# Patient Record
Sex: FEMALE
[De-identification: names, ages, dates, MRNs, and addresses within clinical notes are randomized per-mention and may not be internally consistent; named-entity substitution may affect disease eponyms.]

---

## 2021-10-04 ENCOUNTER — RESULT REVIEW (OUTPATIENT)
Age: 26
End: 2021-10-04

## 2021-11-29 PROBLEM — Z00.00 ENCOUNTER FOR PREVENTIVE HEALTH EXAMINATION: Status: ACTIVE | Noted: 2021-11-29

## 2021-11-30 ENCOUNTER — ASOB RESULT (OUTPATIENT)
Age: 26
End: 2021-11-30

## 2021-11-30 ENCOUNTER — APPOINTMENT (OUTPATIENT)
Dept: ANTEPARTUM | Facility: CLINIC | Age: 26
End: 2021-11-30
Payer: COMMERCIAL

## 2021-11-30 PROCEDURE — 76805 OB US >/= 14 WKS SNGL FETUS: CPT

## 2021-11-30 PROCEDURE — 76817 TRANSVAGINAL US OBSTETRIC: CPT

## 2022-02-08 ENCOUNTER — APPOINTMENT (OUTPATIENT)
Dept: ANTEPARTUM | Facility: CLINIC | Age: 27
End: 2022-02-08
Payer: COMMERCIAL

## 2022-02-08 ENCOUNTER — ASOB RESULT (OUTPATIENT)
Age: 27
End: 2022-02-08

## 2022-02-08 PROCEDURE — 76816 OB US FOLLOW-UP PER FETUS: CPT

## 2022-03-22 ENCOUNTER — ASOB RESULT (OUTPATIENT)
Age: 27
End: 2022-03-22

## 2022-03-22 ENCOUNTER — APPOINTMENT (OUTPATIENT)
Dept: ANTEPARTUM | Facility: CLINIC | Age: 27
End: 2022-03-22
Payer: COMMERCIAL

## 2022-03-22 PROCEDURE — 76819 FETAL BIOPHYS PROFIL W/O NST: CPT

## 2022-04-10 ENCOUNTER — OUTPATIENT (OUTPATIENT)
Dept: OUTPATIENT SERVICES | Facility: HOSPITAL | Age: 27
LOS: 1 days | End: 2022-04-10
Payer: COMMERCIAL

## 2022-04-10 LAB — AMNISURE ROM (RUPTURE OF MEMBRANES): NEGATIVE — SIGNIFICANT CHANGE UP

## 2022-04-10 PROCEDURE — 84112 EVAL AMNIOTIC FLUID PROTEIN: CPT

## 2022-04-10 PROCEDURE — 99214 OFFICE O/P EST MOD 30 MIN: CPT

## 2022-04-12 ENCOUNTER — RESULT REVIEW (OUTPATIENT)
Age: 27
End: 2022-04-12

## 2022-04-12 ENCOUNTER — INPATIENT (INPATIENT)
Facility: HOSPITAL | Age: 27
LOS: 1 days | Discharge: HOME | End: 2022-04-14
Attending: OBSTETRICS & GYNECOLOGY | Admitting: OBSTETRICS & GYNECOLOGY
Payer: COMMERCIAL

## 2022-04-12 VITALS — WEIGHT: 125.66 LBS | HEIGHT: 66 IN

## 2022-04-12 DIAGNOSIS — Z3A.00 WEEKS OF GESTATION OF PREGNANCY NOT SPECIFIED: ICD-10-CM

## 2022-04-12 DIAGNOSIS — O26.899 OTHER SPECIFIED PREGNANCY RELATED CONDITIONS, UNSPECIFIED TRIMESTER: ICD-10-CM

## 2022-04-12 LAB
BASOPHILS # BLD AUTO: 0.04 K/UL — SIGNIFICANT CHANGE UP (ref 0–0.2)
BASOPHILS NFR BLD AUTO: 0.4 % — SIGNIFICANT CHANGE UP (ref 0–2)
BLD GP AB SCN SERPL QL: NEGATIVE — SIGNIFICANT CHANGE UP
COVID-19 SPIKE DOMAIN AB INTERP: POSITIVE
COVID-19 SPIKE DOMAIN ANTIBODY RESULT: >250 U/ML — HIGH
EOSINOPHIL # BLD AUTO: 0.04 K/UL — SIGNIFICANT CHANGE UP (ref 0–0.5)
EOSINOPHIL NFR BLD AUTO: 0.4 % — SIGNIFICANT CHANGE UP (ref 0–6)
HCT VFR BLD CALC: 33.9 % — LOW (ref 34.5–45)
HGB BLD-MCNC: 11.5 G/DL — SIGNIFICANT CHANGE UP (ref 11.5–15.5)
IMM GRANULOCYTES NFR BLD AUTO: 1.6 % — HIGH (ref 0–1.5)
LYMPHOCYTES # BLD AUTO: 18.9 % — SIGNIFICANT CHANGE UP (ref 13–44)
LYMPHOCYTES # BLD AUTO: 2.07 K/UL — SIGNIFICANT CHANGE UP (ref 1–3.3)
MCHC RBC-ENTMCNC: 27.6 PG — SIGNIFICANT CHANGE UP (ref 27–34)
MCHC RBC-ENTMCNC: 33.9 GM/DL — SIGNIFICANT CHANGE UP (ref 32–36)
MCV RBC AUTO: 81.3 FL — SIGNIFICANT CHANGE UP (ref 80–100)
MONOCYTES # BLD AUTO: 0.71 K/UL — SIGNIFICANT CHANGE UP (ref 0–0.9)
MONOCYTES NFR BLD AUTO: 6.5 % — SIGNIFICANT CHANGE UP (ref 2–14)
NEUTROPHILS # BLD AUTO: 7.9 K/UL — HIGH (ref 1.8–7.4)
NEUTROPHILS NFR BLD AUTO: 72.2 % — SIGNIFICANT CHANGE UP (ref 43–77)
NRBC # BLD: 0 /100 WBCS — SIGNIFICANT CHANGE UP (ref 0–0)
PLATELET # BLD AUTO: 324 K/UL — SIGNIFICANT CHANGE UP (ref 150–400)
RBC # BLD: 4.17 M/UL — SIGNIFICANT CHANGE UP (ref 3.8–5.2)
RBC # FLD: 17.8 % — HIGH (ref 10.3–14.5)
RH IG SCN BLD-IMP: POSITIVE — SIGNIFICANT CHANGE UP
RH IG SCN BLD-IMP: POSITIVE — SIGNIFICANT CHANGE UP
SARS-COV-2 IGG+IGM SERPL QL IA: >250 U/ML — HIGH
SARS-COV-2 IGG+IGM SERPL QL IA: POSITIVE
SARS-COV-2 RNA SPEC QL NAA+PROBE: NEGATIVE — SIGNIFICANT CHANGE UP
T PALLIDUM AB TITR SER: NEGATIVE — SIGNIFICANT CHANGE UP
WBC # BLD: 10.94 K/UL — HIGH (ref 3.8–10.5)
WBC # FLD AUTO: 10.94 K/UL — HIGH (ref 3.8–10.5)

## 2022-04-12 PROCEDURE — 88307 TISSUE EXAM BY PATHOLOGIST: CPT | Mod: 26

## 2022-04-12 RX ORDER — AER TRAVELER 0.5 G/1
1 SOLUTION RECTAL; TOPICAL EVERY 4 HOURS
Refills: 0 | Status: DISCONTINUED | OUTPATIENT
Start: 2022-04-12 | End: 2022-04-14

## 2022-04-12 RX ORDER — SODIUM CHLORIDE 9 MG/ML
1000 INJECTION, SOLUTION INTRAVENOUS
Refills: 0 | Status: DISCONTINUED | OUTPATIENT
Start: 2022-04-12 | End: 2022-04-12

## 2022-04-12 RX ORDER — OXYCODONE HYDROCHLORIDE 5 MG/1
5 TABLET ORAL ONCE
Refills: 0 | Status: DISCONTINUED | OUTPATIENT
Start: 2022-04-12 | End: 2022-04-14

## 2022-04-12 RX ORDER — ACETAMINOPHEN 500 MG
975 TABLET ORAL
Refills: 0 | Status: DISCONTINUED | OUTPATIENT
Start: 2022-04-12 | End: 2022-04-14

## 2022-04-12 RX ORDER — PRAMOXINE HYDROCHLORIDE 150 MG/15G
1 AEROSOL, FOAM RECTAL EVERY 4 HOURS
Refills: 0 | Status: DISCONTINUED | OUTPATIENT
Start: 2022-04-12 | End: 2022-04-14

## 2022-04-12 RX ORDER — OXYTOCIN 10 UNIT/ML
333.33 VIAL (ML) INJECTION
Qty: 20 | Refills: 0 | Status: DISCONTINUED | OUTPATIENT
Start: 2022-04-12 | End: 2022-04-12

## 2022-04-12 RX ORDER — OXYTOCIN 10 UNIT/ML
2 VIAL (ML) INJECTION
Qty: 30 | Refills: 0 | Status: DISCONTINUED | OUTPATIENT
Start: 2022-04-12 | End: 2022-04-12

## 2022-04-12 RX ORDER — MAGNESIUM HYDROXIDE 400 MG/1
30 TABLET, CHEWABLE ORAL
Refills: 0 | Status: DISCONTINUED | OUTPATIENT
Start: 2022-04-12 | End: 2022-04-14

## 2022-04-12 RX ORDER — CITRIC ACID/SODIUM CITRATE 300-500 MG
15 SOLUTION, ORAL ORAL EVERY 6 HOURS
Refills: 0 | Status: DISCONTINUED | OUTPATIENT
Start: 2022-04-12 | End: 2022-04-12

## 2022-04-12 RX ORDER — HYDROCORTISONE 1 %
1 OINTMENT (GRAM) TOPICAL EVERY 6 HOURS
Refills: 0 | Status: DISCONTINUED | OUTPATIENT
Start: 2022-04-12 | End: 2022-04-14

## 2022-04-12 RX ORDER — LANOLIN
1 OINTMENT (GRAM) TOPICAL EVERY 6 HOURS
Refills: 0 | Status: DISCONTINUED | OUTPATIENT
Start: 2022-04-12 | End: 2022-04-14

## 2022-04-12 RX ORDER — SODIUM CHLORIDE 9 MG/ML
3 INJECTION INTRAMUSCULAR; INTRAVENOUS; SUBCUTANEOUS EVERY 8 HOURS
Refills: 0 | Status: DISCONTINUED | OUTPATIENT
Start: 2022-04-12 | End: 2022-04-14

## 2022-04-12 RX ORDER — BENZOCAINE 10 %
1 GEL (GRAM) MUCOUS MEMBRANE EVERY 6 HOURS
Refills: 0 | Status: DISCONTINUED | OUTPATIENT
Start: 2022-04-12 | End: 2022-04-14

## 2022-04-12 RX ORDER — TETANUS TOXOID, REDUCED DIPHTHERIA TOXOID AND ACELLULAR PERTUSSIS VACCINE, ADSORBED 5; 2.5; 8; 8; 2.5 [IU]/.5ML; [IU]/.5ML; UG/.5ML; UG/.5ML; UG/.5ML
0.5 SUSPENSION INTRAMUSCULAR ONCE
Refills: 0 | Status: DISCONTINUED | OUTPATIENT
Start: 2022-04-12 | End: 2022-04-14

## 2022-04-12 RX ORDER — KETOROLAC TROMETHAMINE 30 MG/ML
30 SYRINGE (ML) INJECTION ONCE
Refills: 0 | Status: DISCONTINUED | OUTPATIENT
Start: 2022-04-12 | End: 2022-04-12

## 2022-04-12 RX ORDER — IBUPROFEN 200 MG
600 TABLET ORAL EVERY 6 HOURS
Refills: 0 | Status: DISCONTINUED | OUTPATIENT
Start: 2022-04-12 | End: 2022-04-14

## 2022-04-12 RX ORDER — FENTANYL/BUPIVACAINE/NS/PF 2MCG/ML-.1
250 PLASTIC BAG, INJECTION (ML) INJECTION
Refills: 0 | Status: DISCONTINUED | OUTPATIENT
Start: 2022-04-12 | End: 2022-04-12

## 2022-04-12 RX ORDER — OXYCODONE HYDROCHLORIDE 5 MG/1
5 TABLET ORAL
Refills: 0 | Status: DISCONTINUED | OUTPATIENT
Start: 2022-04-12 | End: 2022-04-14

## 2022-04-12 RX ORDER — IBUPROFEN 200 MG
600 TABLET ORAL EVERY 6 HOURS
Refills: 0 | Status: COMPLETED | OUTPATIENT
Start: 2022-04-12 | End: 2023-03-11

## 2022-04-12 RX ORDER — DIPHENHYDRAMINE HCL 50 MG
25 CAPSULE ORAL EVERY 6 HOURS
Refills: 0 | Status: DISCONTINUED | OUTPATIENT
Start: 2022-04-12 | End: 2022-04-14

## 2022-04-12 RX ORDER — SIMETHICONE 80 MG/1
80 TABLET, CHEWABLE ORAL EVERY 4 HOURS
Refills: 0 | Status: DISCONTINUED | OUTPATIENT
Start: 2022-04-12 | End: 2022-04-14

## 2022-04-12 RX ORDER — DIBUCAINE 1 %
1 OINTMENT (GRAM) RECTAL EVERY 6 HOURS
Refills: 0 | Status: DISCONTINUED | OUTPATIENT
Start: 2022-04-12 | End: 2022-04-14

## 2022-04-12 RX ORDER — OXYTOCIN 10 UNIT/ML
333.33 VIAL (ML) INJECTION
Qty: 20 | Refills: 0 | Status: DISCONTINUED | OUTPATIENT
Start: 2022-04-12 | End: 2022-04-14

## 2022-04-12 RX ADMIN — Medication 975 MILLIGRAM(S): at 21:01

## 2022-04-12 RX ADMIN — Medication 600 MILLIGRAM(S): at 20:00

## 2022-04-12 RX ADMIN — AER TRAVELER 1 APPLICATION(S): 0.5 SOLUTION RECTAL; TOPICAL at 17:37

## 2022-04-12 RX ADMIN — Medication 30 MILLIGRAM(S): at 13:18

## 2022-04-12 RX ADMIN — Medication 1 APPLICATION(S): at 17:37

## 2022-04-12 RX ADMIN — Medication 1 APPLICATION(S): at 17:38

## 2022-04-12 RX ADMIN — Medication 600 MILLIGRAM(S): at 19:03

## 2022-04-12 RX ADMIN — SODIUM CHLORIDE 125 MILLILITER(S): 9 INJECTION, SOLUTION INTRAVENOUS at 04:31

## 2022-04-12 RX ADMIN — SODIUM CHLORIDE 3 MILLILITER(S): 9 INJECTION INTRAMUSCULAR; INTRAVENOUS; SUBCUTANEOUS at 21:21

## 2022-04-12 RX ADMIN — Medication 975 MILLIGRAM(S): at 21:42

## 2022-04-12 RX ADMIN — Medication 2 MILLIUNIT(S)/MIN: at 06:36

## 2022-04-12 RX ADMIN — SODIUM CHLORIDE 125 MILLILITER(S): 9 INJECTION, SOLUTION INTRAVENOUS at 04:34

## 2022-04-12 RX ADMIN — Medication 1 SPRAY(S): at 17:37

## 2022-04-12 NOTE — PATIENT PROFILE OB - FALL HARM RISK - UNIVERSAL INTERVENTIONS
Bed in lowest position, wheels locked, appropriate side rails in place/Call bell, personal items and telephone in reach/Instruct patient to call for assistance before getting out of bed or chair/Non-slip footwear when patient is out of bed/[place] to call system/Physically safe environment - no spills, clutter or unnecessary equipment/Purposeful Proactive Rounding/Room/bathroom lighting operational, light cord in reach

## 2022-04-13 RX ADMIN — Medication 975 MILLIGRAM(S): at 20:20

## 2022-04-13 RX ADMIN — Medication 975 MILLIGRAM(S): at 09:15

## 2022-04-13 RX ADMIN — Medication 975 MILLIGRAM(S): at 15:15

## 2022-04-13 RX ADMIN — SODIUM CHLORIDE 3 MILLILITER(S): 9 INJECTION INTRAMUSCULAR; INTRAVENOUS; SUBCUTANEOUS at 05:25

## 2022-04-13 RX ADMIN — Medication 600 MILLIGRAM(S): at 00:42

## 2022-04-13 RX ADMIN — Medication 1 TABLET(S): at 11:31

## 2022-04-13 RX ADMIN — Medication 600 MILLIGRAM(S): at 01:26

## 2022-04-13 RX ADMIN — Medication 600 MILLIGRAM(S): at 12:00

## 2022-04-13 RX ADMIN — Medication 600 MILLIGRAM(S): at 18:30

## 2022-04-13 RX ADMIN — SODIUM CHLORIDE 3 MILLILITER(S): 9 INJECTION INTRAMUSCULAR; INTRAVENOUS; SUBCUTANEOUS at 15:43

## 2022-04-13 RX ADMIN — Medication 975 MILLIGRAM(S): at 03:30

## 2022-04-13 RX ADMIN — Medication 600 MILLIGRAM(S): at 23:20

## 2022-04-13 RX ADMIN — Medication 975 MILLIGRAM(S): at 08:45

## 2022-04-13 RX ADMIN — Medication 975 MILLIGRAM(S): at 03:02

## 2022-04-13 RX ADMIN — Medication 975 MILLIGRAM(S): at 14:38

## 2022-04-13 RX ADMIN — Medication 975 MILLIGRAM(S): at 21:20

## 2022-04-13 RX ADMIN — Medication 600 MILLIGRAM(S): at 17:54

## 2022-04-13 RX ADMIN — Medication 600 MILLIGRAM(S): at 05:20

## 2022-04-13 RX ADMIN — Medication 600 MILLIGRAM(S): at 11:31

## 2022-04-13 NOTE — LACTATION INITIAL EVALUATION - LACTATION INTERVENTIONS
initiate/review safe skin-to-skin/initiate/review hand expression/initiate/review techniques for position and latch/post discharge community resources provided/initiate/review breast massage/compression/reviewed components of an effective feeding and at least 8 effective feedings per day required/reviewed importance of monitoring infant diapers, the breastfeeding log, and minimum output each day/reviewed risks of unnecessary formula supplementation/reviewed risks of artificial nipples/reviewed benefits and recommendations for rooming in/reviewed feeding on demand/by cue at least 8 times a day/reviewed indications of inadequate milk transfer that would require supplementation

## 2022-04-13 NOTE — LACTATION INITIAL EVALUATION - PRETERM DELIVERIES, OB PROFILE
I looked on her formulary and it looks like Nicotrol can only be approved if     She will need to try the patch first   0

## 2022-04-13 NOTE — LACTATION INITIAL EVALUATION - NS LACT CON REASON FOR REQ
39 wk gestation baby girl, seen around 24 hrs of life. Baby with 1.6% wt loss, voiding and stooling. Mother initially interested in both breast and bottle feeding but once implications of this were explained, mother wishes to begin to excluisvely breastfeed. Complete breastfeeding education was provided, manual expression technique taught with proper return demo, colostrum easily expressible bilterally. Baby placed skin to skin with mom. Positioning and latch strategies taught for football hold. Baby sleepy but able to attain a deep, sustained latch, responsive and sucking with stimultation. Mother confirms a strong pull of the nipple and denies discomfort. Baby fed ~30min on left side. Responsive/ frequent feeds, continued/ increased SSC and rooming-in were encouraged. All questions were answered, mother verbalized understanding of teaching and info given. Referral for lactation telehealth was placed for further support s/p d/c./primaparous mom

## 2022-04-14 ENCOUNTER — TRANSCRIPTION ENCOUNTER (OUTPATIENT)
Age: 27
End: 2022-04-14

## 2022-04-14 VITALS
DIASTOLIC BLOOD PRESSURE: 73 MMHG | RESPIRATION RATE: 16 BRPM | SYSTOLIC BLOOD PRESSURE: 113 MMHG | OXYGEN SATURATION: 95 % | HEART RATE: 92 BPM | TEMPERATURE: 98 F

## 2022-04-14 PROCEDURE — 85025 COMPLETE CBC W/AUTO DIFF WBC: CPT

## 2022-04-14 PROCEDURE — 86850 RBC ANTIBODY SCREEN: CPT

## 2022-04-14 PROCEDURE — 87635 SARS-COV-2 COVID-19 AMP PRB: CPT

## 2022-04-14 PROCEDURE — 86900 BLOOD TYPING SEROLOGIC ABO: CPT

## 2022-04-14 PROCEDURE — 86780 TREPONEMA PALLIDUM: CPT

## 2022-04-14 PROCEDURE — 59050 FETAL MONITOR W/REPORT: CPT

## 2022-04-14 PROCEDURE — 86769 SARS-COV-2 COVID-19 ANTIBODY: CPT

## 2022-04-14 PROCEDURE — 36415 COLL VENOUS BLD VENIPUNCTURE: CPT

## 2022-04-14 PROCEDURE — 86901 BLOOD TYPING SEROLOGIC RH(D): CPT

## 2022-04-14 PROCEDURE — 88307 TISSUE EXAM BY PATHOLOGIST: CPT

## 2022-04-14 PROCEDURE — 99214 OFFICE O/P EST MOD 30 MIN: CPT

## 2022-04-14 RX ORDER — BENZOCAINE 10 %
1 GEL (GRAM) MUCOUS MEMBRANE
Qty: 0 | Refills: 0 | DISCHARGE
Start: 2022-04-14

## 2022-04-14 RX ORDER — ACETAMINOPHEN 500 MG
3 TABLET ORAL
Qty: 0 | Refills: 0 | DISCHARGE
Start: 2022-04-14

## 2022-04-14 RX ORDER — IBUPROFEN 200 MG
1 TABLET ORAL
Qty: 0 | Refills: 0 | DISCHARGE
Start: 2022-04-14

## 2022-04-14 RX ADMIN — Medication 975 MILLIGRAM(S): at 12:15

## 2022-04-14 RX ADMIN — Medication 600 MILLIGRAM(S): at 13:24

## 2022-04-14 RX ADMIN — Medication 975 MILLIGRAM(S): at 03:19

## 2022-04-14 RX ADMIN — Medication 1 TABLET(S): at 13:23

## 2022-04-14 RX ADMIN — Medication 975 MILLIGRAM(S): at 04:06

## 2022-04-14 RX ADMIN — Medication 600 MILLIGRAM(S): at 08:45

## 2022-04-14 RX ADMIN — Medication 600 MILLIGRAM(S): at 14:00

## 2022-04-14 RX ADMIN — Medication 600 MILLIGRAM(S): at 08:15

## 2022-04-14 RX ADMIN — Medication 975 MILLIGRAM(S): at 11:36

## 2022-04-14 NOTE — PROGRESS NOTE ADULT - SUBJECTIVE AND OBJECTIVE BOX
Patient evaluated at bedside this morning, resting comfortable in bed, no acute events overnight.  She reports pain is well controlled with tylenol and motrin  She denies headache, dizziness, chest pain, palpitations, shortness of breath, nausea, vomiting, heavy vaginal bleeding or perineal discomfort. Reports decrease in amount of vaginal bleeding and denies clots.  She has been ambulating without assistance, voiding spontaneously, and is breastfeeding.   Tolerating food well, without nausea/vomit.  Passing flatus.     Physical Exam:  T(C): 36.9 (04-13-22 @ 06:00), Max: 36.9 (04-13-22 @ 06:00)  HR: 85 (04-13-22 @ 06:00) (85 - 85)  BP: 98/66 (04-13-22 @ 06:00) (98/66 - 98/66)  RR: 17 (04-13-22 @ 06:00) (17 - 17)  SpO2: 98% (04-13-22 @ 06:00) (98% - 98%)    GA: NAD, A&O x 3  Pulm: normal respiratory rate  Abd: + BS, soft, nontender, nondistended, no rebound or guarding, uterus firm at midline and 2  fb below umbilicus  Extremities: no swelling or calf tenderness                          11.5   10.94 )-----------( 324      ( 12 Apr 2022 03:46 )             33.9           acetaminophen     Tablet .. 975 milliGRAM(s) Oral <User Schedule>  benzocaine 20%/menthol 0.5% Spray 1 Spray(s) Topical every 6 hours PRN  dibucaine 1% Ointment 1 Application(s) Topical every 6 hours PRN  diphenhydrAMINE 25 milliGRAM(s) Oral every 6 hours PRN  diphtheria/tetanus/pertussis (acellular) Vaccine (ADAcel) 0.5 milliLiter(s) IntraMuscular once  hydrocortisone 1% Cream 1 Application(s) Topical every 6 hours PRN  ibuprofen  Tablet. 600 milliGRAM(s) Oral every 6 hours  lanolin Ointment 1 Application(s) Topical every 6 hours PRN  magnesium hydroxide Suspension 30 milliLiter(s) Oral two times a day PRN  oxyCODONE    IR 5 milliGRAM(s) Oral every 3 hours PRN  oxyCODONE    IR 5 milliGRAM(s) Oral once PRN  oxytocin Infusion 333.333 milliUNIT(s)/Min IV Continuous <Continuous>  pramoxine 1%/zinc 5% Cream 1 Application(s) Topical every 4 hours PRN  prenatal multivitamin 1 Tablet(s) Oral daily  simethicone 80 milliGRAM(s) Chew every 4 hours PRN  sodium chloride 0.9% lock flush 3 milliLiter(s) IV Push every 8 hours  witch hazel Pads 1 Application(s) Topical every 4 hours PRN  
Patient evaluated at bedside this morning, resting comfortable in bed, no acute events overnight.  She reports pain is well controlled with tylenol and motrin  She denies headache, dizziness, chest pain, palpitations, shortness of breath, nausea, vomiting, heavy vaginal bleeding or perineal discomfort. Reports decrease in amount of vaginal bleeding and denies clots.  She has been ambulating without assistance, voiding spontaneously, and is breastfeeding.   Tolerating food well, without nausea/vomit.  Passing flatus.     Physical Exam:  T(C): 37 (04-14-22 @ 06:00), Max: 37 (04-14-22 @ 06:00)  HR: 90 (04-14-22 @ 06:00) (90 - 90)  BP: 103/62 (04-14-22 @ 06:00) (103/62 - 103/62)  RR: 18 (04-14-22 @ 06:00) (18 - 18)  SpO2: 98% (04-14-22 @ 06:00) (98% - 98%)    GA: NAD, A&O x 3  Pulm: normal respiratory rate  Abd: + BS, soft, nontender, nondistended, no rebound or guarding, uterus firm at midline and 2  fb below umbilicus  Extremities: no swelling or calf tenderness            acetaminophen     Tablet .. 975 milliGRAM(s) Oral <User Schedule>  benzocaine 20%/menthol 0.5% Spray 1 Spray(s) Topical every 6 hours PRN  dibucaine 1% Ointment 1 Application(s) Topical every 6 hours PRN  diphenhydrAMINE 25 milliGRAM(s) Oral every 6 hours PRN  diphtheria/tetanus/pertussis (acellular) Vaccine (ADAcel) 0.5 milliLiter(s) IntraMuscular once  hydrocortisone 1% Cream 1 Application(s) Topical every 6 hours PRN  ibuprofen  Tablet. 600 milliGRAM(s) Oral every 6 hours  lanolin Ointment 1 Application(s) Topical every 6 hours PRN  magnesium hydroxide Suspension 30 milliLiter(s) Oral two times a day PRN  oxyCODONE    IR 5 milliGRAM(s) Oral every 3 hours PRN  oxyCODONE    IR 5 milliGRAM(s) Oral once PRN  oxytocin Infusion 333.333 milliUNIT(s)/Min IV Continuous <Continuous>  pramoxine 1%/zinc 5% Cream 1 Application(s) Topical every 4 hours PRN  prenatal multivitamin 1 Tablet(s) Oral daily  simethicone 80 milliGRAM(s) Chew every 4 hours PRN  sodium chloride 0.9% lock flush 3 milliLiter(s) IV Push every 8 hours  witch hazel Pads 1 Application(s) Topical every 4 hours PRN

## 2022-04-14 NOTE — PROGRESS NOTE ADULT - ASSESSMENT
A/P 26y s/p , PPD #2  , stable, meeting postpartum milestones   - Pain: well controlled on oral pain meds  - GI: Tolerating regular diet  - : urinating without difficulty/pain  -DVT prophylaxis: ambulating frequently  -Dispo: PPD 2, unless otherwise specified  
A/P 26y s/p , PPD #1  , stable, meeting postpartum milestones   - Pain: well controlled on oral pain meds  - GI: Tolerating regular diet  - : urinating without difficulty/pain  -DVT prophylaxis: ambulating frequently  -Dispo: PPD 2, unless otherwise specified

## 2022-04-14 NOTE — DISCHARGE NOTE OB - MATERIALS PROVIDED
Vaccinations/[facility]  Screening Program/  Immunization Record/Breastfeeding Log/Bottle Feeding Log/Breastfeeding Mother’s Support Group Information/Guide to Postpartum Care/[facility] Hearing Screen Program/Back To Sleep Handout/Shaken Baby Prevention Handout/Breastfeeding Guide and Packet/Birth Certificate Instructions/Discharge Medication Information for Patients and Families Pocket Guide/Tdap Vaccination (VIS Pub Date: 2012)

## 2022-04-14 NOTE — DISCHARGE NOTE OB - NS MD DC FALL RISK RISK
For information on Fall & Injury Prevention, visit: [email or web address] OR  [email or web address] OR  https://www.cdc.gov/steadi/patient.html

## 2022-04-14 NOTE — DISCHARGE NOTE OB - PATIENT PORTAL LINK FT
You can access the FollowMyHealth Patient Portal offered by [facility] by registering at the following website: [email or web address]. By joining [facility]’s FollowMyHealth portal, you will also be able to view your health information using other applications (apps) compatible with our system.

## 2022-04-14 NOTE — DISCHARGE NOTE OB - CARE PROVIDER_API CALL
Luisa Acuna  OBSTETRICS AND GYNECOLOGY  [address] [address]  Phone: [phone]  Fax: [phone]  Follow Up Time: 1 month

## 2022-04-18 ENCOUNTER — NON-APPOINTMENT (OUTPATIENT)
Age: 27
End: 2022-04-18

## 2022-04-21 ENCOUNTER — NON-APPOINTMENT (OUTPATIENT)
Age: 27
End: 2022-04-21

## 2022-04-22 DIAGNOSIS — Z3A.39 39 WEEKS GESTATION OF PREGNANCY: ICD-10-CM

## 2022-04-22 DIAGNOSIS — Z28.09 IMMUNIZATION NOT CARRIED OUT BECAUSE OF OTHER CONTRAINDICATION: ICD-10-CM

## 2022-06-17 LAB — SURGICAL PATHOLOGY STUDY: SIGNIFICANT CHANGE UP

## 2024-09-13 NOTE — PATIENT PROFILE OB - NS PRO DEPRESSION SCREENING Y/N1
Acupuncture/TCM Follow-up/Progress Note        TREATMENT COUNT   Treatment Count:  2     Precautions   Precautions:    Besides acupuncture have you had any invasive needling techniques performed on you in the past 24 hours?    No        SUBJECTIVE:     Diagnosis:  Cervicalgia    Current Symptoms:  Patient reports relief from neck pain for several days after the first treatment.  Some pain has since returned and he will receive this third treatment in one week from today.    DVPRS Pain Scale (0-10):  6       OBJECTIVE:   Physical Inspection: Observations/Measurements    Pulses:  Normal         Traditional Chinese Medicine (TCM) Diagnosis:   Qi stagnation/depletion                     TODAY'S ACUPUNCTURE/TCM TREATMENT:                               TREATMENT PLAN    Treatment Position:  Prone    Acupuncture points:  ESM, LI4, DU15, DU16, GB21, UB10, SP6, UB60, K3, LV3    Acupuncture:  # of needles used:   18                       # of needles out:   18    Needles in place for 30-minute treatment session.     Other Modalities Utilized:    TDP lamp to cervical region                     ASSESSMENT OF TREATMENTS   Patient's Post treatment comments:  Patient reports feeling relaxed with no discernable reduction in symptoms immediately following treatment.  The patient has been compliant with TCM recommendations to resolve chief complaint  To date the patient has made steady progress toward their goals.    Pt would continue to benefit from skilled acupuncture/TCM therapy      GOALS:   Documented at time of initial eval and updated as needed     PLAN OF CARE:   Frequency/Duration  Continuation plan of care and goals were established with the patient.     Acupuncture/TCM Daily Billing:    Acupuncture CPT Codes reflect billing claims  Total Time per CPT Codes: 30 minutes     For Medicare Patients: a supervising/billing physician must be identified.  Is this a Medicare patient:  Yes    The supervising physician, physician  assistant, nurse practitioner, or clinical nurse specialist for today's treatment is:   Zena Ly           no